# Patient Record
Sex: MALE | Race: OTHER | HISPANIC OR LATINO | URBAN - METROPOLITAN AREA
[De-identification: names, ages, dates, MRNs, and addresses within clinical notes are randomized per-mention and may not be internally consistent; named-entity substitution may affect disease eponyms.]

---

## 2019-01-15 ENCOUNTER — EMERGENCY (EMERGENCY)
Facility: HOSPITAL | Age: 19
LOS: 1 days | Discharge: HOME | End: 2019-01-15
Attending: STUDENT IN AN ORGANIZED HEALTH CARE EDUCATION/TRAINING PROGRAM
Payer: SELF-PAY

## 2019-01-15 VITALS
DIASTOLIC BLOOD PRESSURE: 76 MMHG | HEART RATE: 99 BPM | OXYGEN SATURATION: 99 % | SYSTOLIC BLOOD PRESSURE: 130 MMHG | WEIGHT: 139.99 LBS | TEMPERATURE: 97 F | HEIGHT: 60 IN | RESPIRATION RATE: 18 BRPM

## 2019-01-15 PROCEDURE — 73030 X-RAY EXAM OF SHOULDER: CPT

## 2019-01-15 PROCEDURE — 99283 EMERGENCY DEPT VISIT LOW MDM: CPT

## 2019-01-15 PROCEDURE — 99283 EMERGENCY DEPT VISIT LOW MDM: CPT | Mod: 25

## 2019-01-15 PROCEDURE — 99053 MED SERV 10PM-8AM 24 HR FAC: CPT
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
## 2019-01-15 NOTE — ED PROVIDER NOTE - CHPI ED SYMPTOMS NEG
no loss of consciousness nausea, vomiting, sob no neck tenderness/no back pain/no headache/nausea, vomiting, shortness of breath
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
## 2019-01-16 PROCEDURE — 73030 X-RAY EXAM OF SHOULDER: CPT | Mod: 26,LT

## 2020-09-19 ENCOUNTER — EMERGENCY (EMERGENCY)
Facility: HOSPITAL | Age: 20
LOS: 1 days | Discharge: HOME | End: 2020-09-19
Attending: EMERGENCY MEDICINE
Payer: COMMERCIAL

## 2020-09-19 VITALS
SYSTOLIC BLOOD PRESSURE: 132 MMHG | HEIGHT: 60 IN | DIASTOLIC BLOOD PRESSURE: 77 MMHG | RESPIRATION RATE: 18 BRPM | TEMPERATURE: 99 F | HEART RATE: 106 BPM | OXYGEN SATURATION: 99 % | WEIGHT: 139.99 LBS

## 2020-09-19 LAB
ALBUMIN SERPL ELPH-MCNC: 5 G/DL — SIGNIFICANT CHANGE UP (ref 3.3–5.2)
ALP SERPL-CCNC: 114 U/L — SIGNIFICANT CHANGE UP (ref 40–120)
ALT FLD-CCNC: 31 U/L — SIGNIFICANT CHANGE UP
ANION GAP SERPL CALC-SCNC: 14 MMOL/L — SIGNIFICANT CHANGE UP (ref 5–17)
AST SERPL-CCNC: 36 U/L — SIGNIFICANT CHANGE UP
BASOPHILS # BLD AUTO: 0.05 K/UL — SIGNIFICANT CHANGE UP (ref 0–0.2)
BASOPHILS NFR BLD AUTO: 0.4 % — SIGNIFICANT CHANGE UP (ref 0–2)
BILIRUB SERPL-MCNC: 0.4 MG/DL — SIGNIFICANT CHANGE UP (ref 0.4–2)
BUN SERPL-MCNC: 7 MG/DL — LOW (ref 8–20)
CALCIUM SERPL-MCNC: 10 MG/DL — SIGNIFICANT CHANGE UP (ref 8.6–10.2)
CHLORIDE SERPL-SCNC: 104 MMOL/L — SIGNIFICANT CHANGE UP (ref 98–107)
CO2 SERPL-SCNC: 23 MMOL/L — SIGNIFICANT CHANGE UP (ref 22–29)
CREAT SERPL-MCNC: 0.71 MG/DL — SIGNIFICANT CHANGE UP (ref 0.5–1.3)
EOSINOPHIL # BLD AUTO: 0.14 K/UL — SIGNIFICANT CHANGE UP (ref 0–0.5)
EOSINOPHIL NFR BLD AUTO: 1.1 % — SIGNIFICANT CHANGE UP (ref 0–6)
GLUCOSE SERPL-MCNC: 101 MG/DL — HIGH (ref 70–99)
HCT VFR BLD CALC: 44.5 % — SIGNIFICANT CHANGE UP (ref 39–50)
HGB BLD-MCNC: 15.9 G/DL — SIGNIFICANT CHANGE UP (ref 13–17)
IMM GRANULOCYTES NFR BLD AUTO: 0.8 % — SIGNIFICANT CHANGE UP (ref 0–1.5)
LYMPHOCYTES # BLD AUTO: 1.59 K/UL — SIGNIFICANT CHANGE UP (ref 1–3.3)
LYMPHOCYTES # BLD AUTO: 13 % — SIGNIFICANT CHANGE UP (ref 13–44)
MCHC RBC-ENTMCNC: 30 PG — SIGNIFICANT CHANGE UP (ref 27–34)
MCHC RBC-ENTMCNC: 35.7 GM/DL — SIGNIFICANT CHANGE UP (ref 32–36)
MCV RBC AUTO: 84 FL — SIGNIFICANT CHANGE UP (ref 80–100)
MONOCYTES # BLD AUTO: 0.83 K/UL — SIGNIFICANT CHANGE UP (ref 0–0.9)
MONOCYTES NFR BLD AUTO: 6.8 % — SIGNIFICANT CHANGE UP (ref 2–14)
NEUTROPHILS # BLD AUTO: 9.56 K/UL — HIGH (ref 1.8–7.4)
NEUTROPHILS NFR BLD AUTO: 77.9 % — HIGH (ref 43–77)
PLATELET # BLD AUTO: 238 K/UL — SIGNIFICANT CHANGE UP (ref 150–400)
POTASSIUM SERPL-MCNC: 3.6 MMOL/L — SIGNIFICANT CHANGE UP (ref 3.5–5.3)
POTASSIUM SERPL-SCNC: 3.6 MMOL/L — SIGNIFICANT CHANGE UP (ref 3.5–5.3)
PROT SERPL-MCNC: 7.9 G/DL — SIGNIFICANT CHANGE UP (ref 6.6–8.7)
RBC # BLD: 5.3 M/UL — SIGNIFICANT CHANGE UP (ref 4.2–5.8)
RBC # FLD: 12.3 % — SIGNIFICANT CHANGE UP (ref 10.3–14.5)
SODIUM SERPL-SCNC: 140 MMOL/L — SIGNIFICANT CHANGE UP (ref 135–145)
WBC # BLD: 12.27 K/UL — HIGH (ref 3.8–10.5)
WBC # FLD AUTO: 12.27 K/UL — HIGH (ref 3.8–10.5)

## 2020-09-19 PROCEDURE — 73130 X-RAY EXAM OF HAND: CPT | Mod: 26,LT

## 2020-09-19 PROCEDURE — 73110 X-RAY EXAM OF WRIST: CPT | Mod: 26,LT

## 2020-09-19 PROCEDURE — 99285 EMERGENCY DEPT VISIT HI MDM: CPT

## 2020-09-19 RX ORDER — ACETAMINOPHEN 500 MG
650 TABLET ORAL ONCE
Refills: 0 | Status: COMPLETED | OUTPATIENT
Start: 2020-09-19 | End: 2020-09-19

## 2020-09-19 RX ADMIN — Medication 650 MILLIGRAM(S): at 18:04

## 2020-09-19 RX ADMIN — Medication 650 MILLIGRAM(S): at 21:38
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
## 2020-09-19 NOTE — ED PROVIDER NOTE - NSFOLLOWUPINSTRUCTIONS_ED_ALL_ED_FT
- Follow up with your doctor within 2-3 days.   - Return to the ED for any new or worsening symptoms.   - Follow-up with Dr. Canales for further evaluation of nasal bone fractures  - Follow-up with your primary doctor within 1 month for CT scan to further evaluate incidental finding on ct scan of your chest    - Seguimiento con man médico dentro de 2-3 días.   - Volver al ED para cualquier síntoma nuevo o empeoramiento.   - Seguimiento con el Dr. Canales para fran evaluación adicional de las fracturas óseas nasales  - Seguimiento con man médico de cabecera dentro de 1 mes para la tomografía computarizada para evaluar aún más el hallazgo incidental en la tomografía computarizada de man tórax

## 2020-09-19 NOTE — ED PROVIDER NOTE - PHYSICAL EXAMINATION
General: In NAD.  Head: NC/AT.   Eyes: No raccoon eyes. PERRLA, EOMI, no nystagmus. No orbital tenderness.   Ears: No gallegos signs or hemotympanum b/l.  Nose: Dried blood to b/l nares. +Nasal bone tenderness.   Mouth: No dental injuries.  Neck: No abrasions or ecchymosis. Supple, +midline cervical tenderness, no paraspinal TTP. No bony step offs. FROM.  Cardiac: No lifts, heaves, visible pulsations, or thrills. Rate and rhythm regular, S1 & S2 clear. No audible murmur, gallop, or rub.   Chest/Lungs: No deformity, ecchymosis, abrasions. Negative seatbelt sign. Normal AP to lateral diameter. Symmetrical excursion b/l. +Left-sided chest wall tenderness. Breath sounds vesicular, symmetrical and without rales, rhonchi or wheezing b/l.   PV: Radial, DP, PT pulses 2+. Capillary refill <2 seconds.  Abdomen: No scars, lesions, ecchymosis, or visible pulsations. Soft, diffuse abdominal tenderness greatest at LLQ.  Extremities: Left hand with multiple abrasions. Tenderness to left wrist, hand, and 5th digit. No deformity. No snuff box tenderness. Pelvis stable. FROM.  Neuro: GCS 15. A&Ox3. CN II-XII grossly intact. No motor/sensory deficits. Ambulating with steady gait.

## 2020-09-19 NOTE — ED PROVIDER NOTE - ATTENDING CONTRIBUTION TO CARE
MS- + swelling and deformity of the left ankle. No foot deformity. No left knee tenderness. No other extremity tenderness. Pt. is awake and alert. Pt. moving all extremities. I, Dr. Wilder, performed a face to face bedside interview with this patient regarding history of present illness, review of symptoms and relevant past medical, social and family history.  I completed an independent physical examination.  I have also reviewed the ACP's note(s) and discussed the plan with the ACP.

## 2020-09-19 NOTE — ED PROVIDER NOTE - CARE PROVIDER_API CALL
Julianna Canales  PLAST[facility] SURGERY  [address] [place] [id number]  Phone: [phone]  Fax: [phone]  Follow Up Time:
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
## 2020-09-19 NOTE — ED PROVIDER NOTE - PATIENT PORTAL LINK FT
You can access the FollowMyHealth Patient Portal offered by [facility] by registering at the following website: [email or web address]. By joining [employer]’s FollowMyHealth portal, you will also be able to view your health information using other applications (apps) compatible with our system.
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
## 2020-09-20 VITALS
TEMPERATURE: 100 F | HEART RATE: 88 BPM | DIASTOLIC BLOOD PRESSURE: 67 MMHG | SYSTOLIC BLOOD PRESSURE: 106 MMHG | OXYGEN SATURATION: 98 % | RESPIRATION RATE: 18 BRPM

## 2020-09-20 PROCEDURE — 73110 X-RAY EXAM OF WRIST: CPT

## 2020-09-20 PROCEDURE — 70450 CT HEAD/BRAIN W/O DYE: CPT

## 2020-09-20 PROCEDURE — 72125 CT NECK SPINE W/O DYE: CPT

## 2020-09-20 PROCEDURE — 70450 CT HEAD/BRAIN W/O DYE: CPT | Mod: 26

## 2020-09-20 PROCEDURE — 71260 CT THORAX DX C+: CPT | Mod: 26

## 2020-09-20 PROCEDURE — 73130 X-RAY EXAM OF HAND: CPT

## 2020-09-20 PROCEDURE — 99284 EMERGENCY DEPT VISIT MOD MDM: CPT | Mod: 25

## 2020-09-20 PROCEDURE — 85025 COMPLETE CBC W/AUTO DIFF WBC: CPT

## 2020-09-20 PROCEDURE — 74177 CT ABD & PELVIS W/CONTRAST: CPT

## 2020-09-20 PROCEDURE — 72125 CT NECK SPINE W/O DYE: CPT | Mod: 26

## 2020-09-20 PROCEDURE — 36415 COLL VENOUS BLD VENIPUNCTURE: CPT

## 2020-09-20 PROCEDURE — 71260 CT THORAX DX C+: CPT

## 2020-09-20 PROCEDURE — 80053 COMPREHEN METABOLIC PANEL: CPT

## 2020-09-20 PROCEDURE — 74177 CT ABD & PELVIS W/CONTRAST: CPT | Mod: 26

## 2020-09-20 PROCEDURE — T1013: CPT

## 2020-09-20 RX ORDER — METHOCARBAMOL 500 MG/1
1500 TABLET, FILM COATED ORAL ONCE
Refills: 0 | Status: COMPLETED | OUTPATIENT
Start: 2020-09-20 | End: 2020-09-20

## 2020-09-20 RX ADMIN — METHOCARBAMOL 1500 MILLIGRAM(S): 500 TABLET, FILM COATED ORAL at 00:47

## 2021-02-09 ENCOUNTER — EMERGENCY (EMERGENCY)
Facility: HOSPITAL | Age: 21
LOS: 1 days | Discharge: HOME | End: 2021-02-09
Attending: EMERGENCY MEDICINE
Payer: COMMERCIAL

## 2021-02-09 VITALS
HEART RATE: 109 BPM | RESPIRATION RATE: 18 BRPM | WEIGHT: 160.06 LBS | OXYGEN SATURATION: 97 % | HEIGHT: 60 IN | TEMPERATURE: 98 F | SYSTOLIC BLOOD PRESSURE: 123 MMHG | DIASTOLIC BLOOD PRESSURE: 87 MMHG

## 2021-02-09 PROCEDURE — 99284 EMERGENCY DEPT VISIT MOD MDM: CPT

## 2021-02-09 PROCEDURE — 99283 EMERGENCY DEPT VISIT LOW MDM: CPT

## 2021-02-09 RX ORDER — IBUPROFEN 200 MG
600 TABLET ORAL ONCE
Refills: 0 | Status: COMPLETED | OUTPATIENT
Start: 2021-02-09 | End: 2021-02-09

## 2021-02-09 RX ORDER — IBUPROFEN 200 MG
1 TABLET ORAL
Qty: 28 | Refills: 0
Start: 2021-02-09 | End: 2021-02-15

## 2021-02-09 RX ORDER — METHOCARBAMOL 500 MG/1
1 TABLET, FILM COATED ORAL
Qty: 14 | Refills: 0
Start: 2021-02-09 | End: 2021-02-15

## 2021-02-09 RX ADMIN — Medication 600 MILLIGRAM(S): at 18:06
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
## 2021-02-09 NOTE — ED PROVIDER NOTE - PATIENT PORTAL LINK FT
You can access the FollowMyHealth Patient Portal offered by [facility] by registering at the following website: [email or web address]. By joining [employer]’s FollowMyHealth portal, you will also be able to view your health information using other applications (apps) compatible with our system.

## 2022-08-15 NOTE — ED PROVIDER NOTE - PATIENT/CAREGIVER OFFERED  INTERPRETER SERVICES
Per pt, had run out of medication about a month ago. Will need refill sent to [facility], please. Niki Morin called requesting a refill of the below medication which has been pended for you:     Requested Prescriptions     Pending Prescriptions Disp Refills    levothyroxine (SYNTHROID) 50 MCG tablet 30 tablet 3     Sig: Take 1 tablet by mouth in the morning.        Last Appointment Date: 1/20/2022  Next Appointment Date: 8/26/2022    Allergies   Allergen Reactions    Codeine      Severe Abdominal pain    Sulfa Antibiotics      Patient unsure of reaction yes

## 2022-12-02 NOTE — ED ADULT TRIAGE NOTE - TELEPHONIC ID NUMBER OF THE INTERPRETER
----- Message from Gladis Small sent at 12/2/2022  9:24 AM CST -----  Regarding: running late  Name of Who is Calling: EZEKIEL PENNINGTON [[id number]]      What is the request in detail: Patient wanted to let the office know she will be running a little late        Can the clinic reply by [facility]: no      What Number to Call Back if not in [facility]: [phone]                                       friend speaks english

## 2023-02-01 ENCOUNTER — EMERGENCY (EMERGENCY)
Facility: HOSPITAL | Age: 23
LOS: 1 days | Discharge: HOME | End: 2023-02-01
Attending: EMERGENCY MEDICINE
Payer: COMMERCIAL

## 2023-02-01 VITALS
DIASTOLIC BLOOD PRESSURE: 84 MMHG | RESPIRATION RATE: 18 BRPM | TEMPERATURE: 98 F | HEART RATE: 74 BPM | SYSTOLIC BLOOD PRESSURE: 124 MMHG | OXYGEN SATURATION: 99 %

## 2023-02-01 PROCEDURE — 99283 EMERGENCY DEPT VISIT LOW MDM: CPT

## 2023-02-01 PROCEDURE — 96372 THER/PROPH/DIAG INJ SC/IM: CPT

## 2023-02-01 PROCEDURE — 99284 EMERGENCY DEPT VISIT MOD MDM: CPT

## 2023-02-01 RX ORDER — METHOCARBAMOL 500 MG/1
1500 TABLET, FILM COATED ORAL ONCE
Refills: 0 | Status: COMPLETED | OUTPATIENT
Start: 2023-02-01 | End: 2023-02-01

## 2023-02-01 RX ORDER — IBUPROFEN 200 MG
1 TABLET ORAL
Qty: 28 | Refills: 0
Start: 2023-02-01 | End: 2023-02-07

## 2023-02-01 RX ORDER — KETOROLAC TROMETHAMINE 30 MG/ML
30 SYRINGE (ML) INJECTION ONCE
Refills: 0 | Status: DISCONTINUED | OUTPATIENT
Start: 2023-02-01 | End: 2023-02-01

## 2023-02-01 RX ORDER — METHOCARBAMOL 500 MG/1
2 TABLET, FILM COATED ORAL
Qty: 18 | Refills: 0
Start: 2023-02-01 | End: 2023-02-03

## 2023-02-01 RX ADMIN — Medication 30 MILLIGRAM(S): at 20:21

## 2023-02-01 RX ADMIN — METHOCARBAMOL 1500 MILLIGRAM(S): 500 TABLET, FILM COATED ORAL at 20:21

## 2023-02-01 NOTE — ED PROVIDER NOTE - CLINICAL SUMMARY MEDICAL DECISION MAKING FREE TEXT BOX
22-year-old male presents to the ED complaint of right-sided back pain radiating down his right leg x1 day.  Patient neuro intact.  Patient received medication in the ED and reports relief of symptoms.  Patient stable for discharge with medication sent to pharmacy.  Patient able to ambulate in the ED without any difficulty upon discharge.

## 2023-02-01 NOTE — ED PROVIDER NOTE - OBJECTIVE STATEMENT
22-year-old male no medical history presents to the ED complaining of right-sided back pain radiating down his right leg x1 day.  Patient states that he was doing heavy lifting and bending at work when he first experienced the pain. Pt otherwise denies recent trauma/injury, fever/chills, c/p, sob, abd pain, n/v/c/d, dysuria, numbness/tingling/weakness, saddle anesthesia, urinary/bowel dysfunction and has no other complaints at this time.

## 2023-02-01 NOTE — ED PROVIDER NOTE - ATTENDING APP SHARED VISIT CONTRIBUTION OF CARE
The patient discussed with PA    Back Pain    I, Braulio Mosquera, performed the initial face to face bedside interview with this patient regarding history of present illness, review of symptoms and relevant past medical, social and family history.  I completed an independent physical examination.  I was the initial provider who evaluated this patient. I have signed out the follow up of any pending tests (i.e. labs, radiological studies) to the ACP.  I have communicated the patient’s plan of care and disposition with the ACP.

## 2023-02-01 NOTE — ED ADULT NURSE NOTE - OBJECTIVE STATEMENT
Pt. is a 21 yo M who p/w c/o back pain. Pt. A&Ox4 and amb. Endorsing mid back pain w/ radiation to rt. leg after heavy lifting at work today. Denies bowel/bladder incontinence. Resp. equal and unlabored b/l. VSS. NAD. Comfort measures provided, safety measures implemented, call bell in reach. MD at bedside.

## 2023-02-01 NOTE — ED PROVIDER NOTE - MUSCULOSKELETAL, MLM
Spine appears normal, range of motion is not limited. No C/T/L midline TTP. + right paraspinal lumbar/thoracic TTP.

## 2023-02-01 NOTE — ED PROVIDER NOTE - PATIENT PORTAL LINK FT
You can access the FollowMyHealth Patient Portal offered by [facility] by registering at the following website: [email or web address]. By joining [employer]’s FollowMyHealth portal, you will also be able to view your health information using other applications (apps) compatible with our system.

## 2023-02-01 NOTE — ED PROVIDER NOTE - NEUROLOGICAL, MLM
Alert and oriented, no focal deficits, no motor or sensory deficits. 5/5 strength in UE/LE b/l. CN II-XII.